# Patient Record
Sex: MALE | Race: WHITE | NOT HISPANIC OR LATINO | URBAN - METROPOLITAN AREA
[De-identification: names, ages, dates, MRNs, and addresses within clinical notes are randomized per-mention and may not be internally consistent; named-entity substitution may affect disease eponyms.]

---

## 2017-03-26 ENCOUNTER — EMERGENCY (EMERGENCY)
Facility: HOSPITAL | Age: 32
LOS: 1 days | Discharge: HOME | End: 2017-03-26
Attending: EMERGENCY MEDICINE
Payer: MEDICAID

## 2017-03-26 VITALS
TEMPERATURE: 98 F | SYSTOLIC BLOOD PRESSURE: 174 MMHG | HEIGHT: 71 IN | HEART RATE: 61 BPM | DIASTOLIC BLOOD PRESSURE: 88 MMHG | RESPIRATION RATE: 18 BRPM | OXYGEN SATURATION: 100 %

## 2017-03-26 DIAGNOSIS — L98.8 OTHER SPECIFIED DISORDERS OF THE SKIN AND SUBCUTANEOUS TISSUE: ICD-10-CM

## 2017-03-26 DIAGNOSIS — Z87.891 PERSONAL HISTORY OF NICOTINE DEPENDENCE: ICD-10-CM

## 2017-03-26 PROCEDURE — 99283 EMERGENCY DEPT VISIT LOW MDM: CPT

## 2017-03-26 PROCEDURE — 99282 EMERGENCY DEPT VISIT SF MDM: CPT

## 2017-03-26 NOTE — ED ADULT TRIAGE NOTE - CHIEF COMPLAINT QUOTE
c/o cyst to upper left arm and swollen,been there x 7 years as per patient,also had panic attack at 7 pm today,fell better now

## 2017-03-26 NOTE — ED PROVIDER NOTE - NEUROLOGICAL, MLM
Alert and oriented, no focal deficits, no motor or sensory deficits. L hand and wrist w/ 5/5 strength and sensation. Alert and oriented, no focal deficits, no motor or sensory deficits. L hand and wrist w/ 5/5 strength and normal sensation.

## 2017-03-26 NOTE — ED PROVIDER NOTE - OBJECTIVE STATEMENT
30 y/o M pt w/ no significant PMHx presents to the ED c/o worsening cyst L arm x1 week. Pt states that he has had the cyst on his L arm for 7 years but banged into it last weekend; pt's cyst has been painful, red, and swollen since. Pt denies fever, nausea, vomiting, numbness/tingling, or any other complaints.  NKDA. 32 y/o M pt w/ no significant PMHx presents to the ED c/o L arm cyst w/ pain x1 week. Pt states that he has had the cyst on his L arm for 7 years but banged into it last weekend; pt's cyst has been painful, red, and swollen since. Pt denies fever, nausea, vomiting, numbness/tingling, or any other complaints.  NKDA. 32 y/o M pt w/ no significant PMHx presents to the ED c/o L arm "cyst" w/ pain x1 week. Pt states that he has had the cyst on his L arm for 7 years but banged into it last weekend; pt's cyst has been painful, red, and swollen since. Pt denies fever, nausea, vomiting, numbness/tingling, or any other complaints.  NKDA.

## 2017-03-26 NOTE — ED PROVIDER NOTE - MEDICAL DECISION MAKING DETAILS
32 y/o M pt w/ LUE mass, possible cyst or lipoma. No signs of infection. Will give pain meds, and refer to general surgery for further evaluation.

## 2017-03-26 NOTE — ED PROVIDER NOTE - NS ED MD SCRIBE ATTENDING SCRIBE SECTIONS
PAST MEDICAL/SURGICAL/SOCIAL HISTORY/HISTORY OF PRESENT ILLNESS/REVIEW OF SYSTEMS/DISPOSITION/HIV/VITAL SIGNS( Pullset)/PHYSICAL EXAM

## 2017-03-26 NOTE — ED PROVIDER NOTE - SKIN, MLM
Skin normal color for race, warm, dry and intact. No evidence of rash. L upper outer arm proximal 2cm round, soft, lesion w/ jelly consistency w/ slight erythema, slight tenderness, no increased warmth, no fluctuance. 2+ radial pulses b/l.

## 2017-03-30 PROBLEM — Z00.00 ENCOUNTER FOR PREVENTIVE HEALTH EXAMINATION: Status: ACTIVE | Noted: 2017-03-30

## 2017-04-04 ENCOUNTER — LABORATORY RESULT (OUTPATIENT)
Age: 32
End: 2017-04-04

## 2017-04-04 ENCOUNTER — APPOINTMENT (OUTPATIENT)
Dept: SURGERY | Facility: CLINIC | Age: 32
End: 2017-04-04

## 2017-04-04 VITALS
TEMPERATURE: 98 F | HEIGHT: 72 IN | BODY MASS INDEX: 21.94 KG/M2 | HEART RATE: 78 BPM | WEIGHT: 162 LBS | DIASTOLIC BLOOD PRESSURE: 88 MMHG | SYSTOLIC BLOOD PRESSURE: 158 MMHG

## 2017-04-12 ENCOUNTER — OUTPATIENT (OUTPATIENT)
Dept: OUTPATIENT SERVICES | Facility: HOSPITAL | Age: 32
LOS: 1 days | End: 2017-04-12
Payer: MEDICAID

## 2017-04-12 VITALS
HEIGHT: 71 IN | WEIGHT: 164.02 LBS | SYSTOLIC BLOOD PRESSURE: 130 MMHG | HEART RATE: 62 BPM | DIASTOLIC BLOOD PRESSURE: 80 MMHG | RESPIRATION RATE: 16 BRPM

## 2017-04-12 DIAGNOSIS — R22.30 LOCALIZED SWELLING, MASS AND LUMP, UNSPECIFIED UPPER LIMB: ICD-10-CM

## 2017-04-12 DIAGNOSIS — Z01.818 ENCOUNTER FOR OTHER PREPROCEDURAL EXAMINATION: ICD-10-CM

## 2017-04-12 PROCEDURE — G0463: CPT

## 2017-04-12 NOTE — H&P PST ADULT - FAMILY HISTORY
Grandparent  Still living? Unknown  Family history of diabetes mellitus, Age at diagnosis: Age Unknown  Family history of hypertension, Age at diagnosis: Age Unknown

## 2017-04-12 NOTE — H&P PST ADULT - HISTORY OF PRESENT ILLNESS
cyst on left shoulder for 1 year which has increased in size with occasional pain.  Now for excision

## 2017-04-12 NOTE — H&P PST ADULT - NSANTHOSAYNRD_GEN_A_CORE
No. MOMO screening performed.  STOP BANG Legend: 0-2 = LOW Risk; 3-4 = INTERMEDIATE Risk; 5-8 = HIGH Risk

## 2017-04-13 ENCOUNTER — RESULT REVIEW (OUTPATIENT)
Age: 32
End: 2017-04-13

## 2017-04-13 DIAGNOSIS — R22.9 LOCALIZED SWELLING, MASS AND LUMP, UNSPECIFIED: ICD-10-CM

## 2017-04-13 RX ORDER — SODIUM CHLORIDE 9 MG/ML
3 INJECTION INTRAMUSCULAR; INTRAVENOUS; SUBCUTANEOUS EVERY 8 HOURS
Qty: 0 | Refills: 0 | Status: DISCONTINUED | OUTPATIENT
Start: 2017-04-14 | End: 2017-04-14

## 2017-04-14 ENCOUNTER — TRANSCRIPTION ENCOUNTER (OUTPATIENT)
Age: 32
End: 2017-04-14

## 2017-04-14 ENCOUNTER — OUTPATIENT (OUTPATIENT)
Dept: OUTPATIENT SERVICES | Facility: HOSPITAL | Age: 32
LOS: 1 days | Discharge: HOME | End: 2017-04-14
Payer: MEDICAID

## 2017-04-14 VITALS
WEIGHT: 164.02 LBS | HEART RATE: 58 BPM | SYSTOLIC BLOOD PRESSURE: 123 MMHG | DIASTOLIC BLOOD PRESSURE: 73 MMHG | OXYGEN SATURATION: 100 % | HEIGHT: 71 IN | RESPIRATION RATE: 16 BRPM | TEMPERATURE: 97 F

## 2017-04-14 VITALS
RESPIRATION RATE: 16 BRPM | DIASTOLIC BLOOD PRESSURE: 69 MMHG | SYSTOLIC BLOOD PRESSURE: 127 MMHG | HEART RATE: 51 BPM | OXYGEN SATURATION: 98 % | TEMPERATURE: 98 F

## 2017-04-14 DIAGNOSIS — Z87.891 PERSONAL HISTORY OF NICOTINE DEPENDENCE: ICD-10-CM

## 2017-04-14 DIAGNOSIS — L72.0 EPIDERMAL CYST: ICD-10-CM

## 2017-04-14 DIAGNOSIS — R22.30 LOCALIZED SWELLING, MASS AND LUMP, UNSPECIFIED UPPER LIMB: ICD-10-CM

## 2017-04-14 PROCEDURE — 23071 EXC SHOULDER LES SC 3 CM/>: CPT | Mod: AS

## 2017-04-14 PROCEDURE — 23076 EXC SHOULDER TUM DEEP < 5 CM: CPT | Mod: LT

## 2017-04-14 PROCEDURE — 88304 TISSUE EXAM BY PATHOLOGIST: CPT | Mod: 26

## 2017-04-14 PROCEDURE — 88304 TISSUE EXAM BY PATHOLOGIST: CPT

## 2017-04-14 RX ORDER — ACETAMINOPHEN WITH CODEINE 300MG-30MG
1 TABLET ORAL EVERY 4 HOURS
Qty: 0 | Refills: 0 | Status: DISCONTINUED | OUTPATIENT
Start: 2017-04-14 | End: 2017-04-14

## 2017-04-14 RX ORDER — ACETAMINOPHEN WITH CODEINE 300MG-30MG
1 TABLET ORAL
Qty: 6 | Refills: 0 | OUTPATIENT
Start: 2017-04-14

## 2017-04-14 NOTE — ASU DISCHARGE PLAN (ADULT/PEDIATRIC). - NOTIFY
Bleeding that does not stop/Swelling that continues/Pain not relieved by Medications/Fever greater than 101

## 2017-04-14 NOTE — ASU DISCHARGE PLAN (ADULT/PEDIATRIC). - MEDICATION SUMMARY - MEDICATIONS TO TAKE
I will START or STAY ON the medications listed below when I get home from the hospital:    acetaminophen-codeine 300 mg-30 mg oral tablet  -- 1 tab(s) by mouth every 4 hours, As needed, Moderate Pain (4 - 6) MDD:4  -- Indication: For if pain

## 2017-04-18 LAB — SURGICAL PATHOLOGY FINAL REPORT - CH: SIGNIFICANT CHANGE UP

## 2017-05-02 ENCOUNTER — APPOINTMENT (OUTPATIENT)
Dept: SURGERY | Facility: CLINIC | Age: 32
End: 2017-05-02

## 2017-05-02 VITALS
HEART RATE: 49 BPM | SYSTOLIC BLOOD PRESSURE: 146 MMHG | WEIGHT: 165 LBS | TEMPERATURE: 97.9 F | BODY MASS INDEX: 22.38 KG/M2 | DIASTOLIC BLOOD PRESSURE: 89 MMHG

## 2019-10-07 ENCOUNTER — OTHER (OUTPATIENT)
Age: 34
End: 2019-10-07

## 2021-03-10 NOTE — H&P PST ADULT - LYMPH NODES
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  Reviewed the material risks,  benefits,  and alternatives with Ms. EDNISE FERRARO  , including no surgery, and she  understands and wishes to proceed.\par 
not examined

## 2024-07-12 NOTE — ASU DISCHARGE PLAN (ADULT/PEDIATRIC). - PATIENT/GUARDIAN NAME (SIGNATURE): ____________________________________
Problem: Wound:  Goal: Will show signs of wound healing; wound closure and no evidence of infection  Description: Will show signs of wound healing; wound closure and no evidence of infection  Outcome: Not Progressing   Pt seen in [facility] this week for f/u  Right lateral ankle wound - c/o issues with fluid retention - weight up 10lbs- recently saw Dr. EMILY nettles - restarted Diuretics, Has Gout flare up- started steriods , dc d indocin and started allopurinol - also reported being Dx with amyloidosis per Cardiologist - Wound debride per Dr. Blas - treatment as follows     Right Lateral Ankle  Nexodyn  Santyl to wound  Sorbact  JANE - Please give patient extra dressing and patient instructions   Leave in place for the week - Do NOT get wet!  Single layer Medium (F) Medigrip to keep dressing in place    Stressed elevation  and cont diuretics as directed - f/u in 1 week    No Statement Selected